# Patient Record
Sex: FEMALE | Race: OTHER | Employment: UNEMPLOYED | URBAN - METROPOLITAN AREA
[De-identification: names, ages, dates, MRNs, and addresses within clinical notes are randomized per-mention and may not be internally consistent; named-entity substitution may affect disease eponyms.]

---

## 2017-08-09 LAB
ANTIBODY SCREEN, EXTERNAL: NEGATIVE
CHLAMYDIA, EXTERNAL: NEGATIVE
HBSAG, EXTERNAL: NEGATIVE
HIV, EXTERNAL: NEGATIVE
RPR, EXTERNAL: NON REACTIVE
RUBELLA, EXTERNAL: NORMAL
TYPE, ABO & RH, EXTERNAL: NORMAL

## 2018-02-13 LAB
GRBS, EXTERNAL: NEGATIVE
N. GONORRHEA, EXTERNAL: NEGATIVE

## 2018-03-20 ENCOUNTER — HOSPITAL ENCOUNTER (INPATIENT)
Age: 28
LOS: 4 days | Discharge: HOME | End: 2018-03-24
Attending: OBSTETRICS & GYNECOLOGY | Admitting: OBSTETRICS & GYNECOLOGY
Payer: COMMERCIAL

## 2018-03-20 PROBLEM — Z33.1 IUP (INTRAUTERINE PREGNANCY), INCIDENTAL: Status: ACTIVE | Noted: 2018-03-20

## 2018-03-20 LAB
ABO + RH BLD: NORMAL
BASOPHILS # BLD: 0 K/UL (ref 0–0.06)
BASOPHILS NFR BLD: 0 % (ref 0–2)
BLOOD GROUP ANTIBODIES SERPL: NORMAL
DIFFERENTIAL METHOD BLD: ABNORMAL
EOSINOPHIL # BLD: 0.1 K/UL (ref 0–0.4)
EOSINOPHIL NFR BLD: 1 % (ref 0–5)
ERYTHROCYTE [DISTWIDTH] IN BLOOD BY AUTOMATED COUNT: 14.1 % (ref 11.6–14.5)
HCT VFR BLD AUTO: 36.3 % (ref 35–45)
HGB BLD-MCNC: 12 G/DL (ref 12–16)
LYMPHOCYTES # BLD: 2.3 K/UL (ref 0.9–3.6)
LYMPHOCYTES NFR BLD: 23 % (ref 21–52)
MCH RBC QN AUTO: 29.7 PG (ref 24–34)
MCHC RBC AUTO-ENTMCNC: 33.1 G/DL (ref 31–37)
MCV RBC AUTO: 89.9 FL (ref 74–97)
MONOCYTES # BLD: 0.7 K/UL (ref 0.05–1.2)
MONOCYTES NFR BLD: 7 % (ref 3–10)
NEUTS SEG # BLD: 6.6 K/UL (ref 1.8–8)
NEUTS SEG NFR BLD: 69 % (ref 40–73)
PLATELET # BLD AUTO: 215 K/UL (ref 135–420)
PMV BLD AUTO: 10.6 FL (ref 9.2–11.8)
RBC # BLD AUTO: 4.04 M/UL (ref 4.2–5.3)
SPECIMEN EXP DATE BLD: NORMAL
WBC # BLD AUTO: 9.7 K/UL (ref 4.6–13.2)

## 2018-03-20 PROCEDURE — 77030028565 HC CATH CERV RIPNG BLN COOK -B

## 2018-03-20 PROCEDURE — 74011250637 HC RX REV CODE- 250/637: Performed by: ADVANCED PRACTICE MIDWIFE

## 2018-03-20 PROCEDURE — 75410000002 HC LABOR FEE PER 1 HR

## 2018-03-20 PROCEDURE — 76815 OB US LIMITED FETUS(S): CPT

## 2018-03-20 PROCEDURE — 74011250636 HC RX REV CODE- 250/636

## 2018-03-20 PROCEDURE — 86900 BLOOD TYPING SEROLOGIC ABO: CPT | Performed by: ADVANCED PRACTICE MIDWIFE

## 2018-03-20 PROCEDURE — 36415 COLL VENOUS BLD VENIPUNCTURE: CPT | Performed by: ADVANCED PRACTICE MIDWIFE

## 2018-03-20 PROCEDURE — 74011250636 HC RX REV CODE- 250/636: Performed by: ADVANCED PRACTICE MIDWIFE

## 2018-03-20 PROCEDURE — 65270000029 HC RM PRIVATE

## 2018-03-20 PROCEDURE — 85025 COMPLETE CBC W/AUTO DIFF WBC: CPT | Performed by: ADVANCED PRACTICE MIDWIFE

## 2018-03-20 PROCEDURE — 3E0P7VZ INTRODUCTION OF HORMONE INTO FEMALE REPRODUCTIVE, VIA NATURAL OR ARTIFICIAL OPENING: ICD-10-PCS | Performed by: OBSTETRICS & GYNECOLOGY

## 2018-03-20 RX ORDER — BUTORPHANOL TARTRATE 2 MG/ML
2 INJECTION INTRAMUSCULAR; INTRAVENOUS
Status: DISCONTINUED | OUTPATIENT
Start: 2018-03-20 | End: 2018-03-21 | Stop reason: HOSPADM

## 2018-03-20 RX ORDER — ACETAMINOPHEN 325 MG/1
650 TABLET ORAL
Status: DISCONTINUED | OUTPATIENT
Start: 2018-03-20 | End: 2018-03-24 | Stop reason: HOSPADM

## 2018-03-20 RX ORDER — METHYLERGONOVINE MALEATE 0.2 MG/ML
0.2 INJECTION INTRAVENOUS AS NEEDED
Status: DISCONTINUED | OUTPATIENT
Start: 2018-03-20 | End: 2018-03-21 | Stop reason: HOSPADM

## 2018-03-20 RX ORDER — HYDROMORPHONE HYDROCHLORIDE 2 MG/ML
1 INJECTION, SOLUTION INTRAMUSCULAR; INTRAVENOUS; SUBCUTANEOUS
Status: DISCONTINUED | OUTPATIENT
Start: 2018-03-20 | End: 2018-03-21 | Stop reason: HOSPADM

## 2018-03-20 RX ORDER — HYDROMORPHONE HYDROCHLORIDE 2 MG/ML
1 INJECTION, SOLUTION INTRAMUSCULAR; INTRAVENOUS; SUBCUTANEOUS
Status: DISCONTINUED | OUTPATIENT
Start: 2018-03-20 | End: 2018-03-20 | Stop reason: RX

## 2018-03-20 RX ORDER — ACETAMINOPHEN 500 MG
500 TABLET ORAL
Status: DISCONTINUED | OUTPATIENT
Start: 2018-03-20 | End: 2018-03-20

## 2018-03-20 RX ORDER — MINERAL OIL
30 OIL (ML) ORAL AS NEEDED
Status: DISCONTINUED | OUTPATIENT
Start: 2018-03-20 | End: 2018-03-21 | Stop reason: HOSPADM

## 2018-03-20 RX ORDER — OXYTOCIN/RINGER'S LACTATE 20/1000 ML
125 PLASTIC BAG, INJECTION (ML) INTRAVENOUS CONTINUOUS
Status: DISCONTINUED | OUTPATIENT
Start: 2018-03-20 | End: 2018-03-21 | Stop reason: HOSPADM

## 2018-03-20 RX ORDER — OXYTOCIN IN 5 % DEXTROSE 30/500 ML
2 PLASTIC BAG, INJECTION (ML) INTRAVENOUS
Status: DISCONTINUED | OUTPATIENT
Start: 2018-03-21 | End: 2018-03-24 | Stop reason: HOSPADM

## 2018-03-20 RX ORDER — SODIUM CHLORIDE, SODIUM LACTATE, POTASSIUM CHLORIDE, CALCIUM CHLORIDE 600; 310; 30; 20 MG/100ML; MG/100ML; MG/100ML; MG/100ML
125 INJECTION, SOLUTION INTRAVENOUS CONTINUOUS
Status: DISCONTINUED | OUTPATIENT
Start: 2018-03-20 | End: 2018-03-24 | Stop reason: HOSPADM

## 2018-03-20 RX ORDER — TERBUTALINE SULFATE 1 MG/ML
0.25 INJECTION SUBCUTANEOUS
Status: DISCONTINUED | OUTPATIENT
Start: 2018-03-20 | End: 2018-03-21 | Stop reason: HOSPADM

## 2018-03-20 RX ORDER — LIDOCAINE HYDROCHLORIDE 10 MG/ML
20 INJECTION, SOLUTION EPIDURAL; INFILTRATION; INTRACAUDAL; PERINEURAL AS NEEDED
Status: DISCONTINUED | OUTPATIENT
Start: 2018-03-20 | End: 2018-03-21 | Stop reason: HOSPADM

## 2018-03-20 RX ORDER — NALBUPHINE HYDROCHLORIDE 10 MG/ML
10 INJECTION, SOLUTION INTRAMUSCULAR; INTRAVENOUS; SUBCUTANEOUS
Status: DISCONTINUED | OUTPATIENT
Start: 2018-03-20 | End: 2018-03-21 | Stop reason: HOSPADM

## 2018-03-20 RX ORDER — MISOPROSTOL 100 UG/1
TABLET ORAL
Status: DISPENSED
Start: 2018-03-20 | End: 2018-03-20

## 2018-03-20 RX ORDER — ONDANSETRON 2 MG/ML
INJECTION INTRAMUSCULAR; INTRAVENOUS
Status: COMPLETED
Start: 2018-03-20 | End: 2018-03-21

## 2018-03-20 RX ORDER — ONDANSETRON 2 MG/ML
4 INJECTION INTRAMUSCULAR; INTRAVENOUS
Status: DISCONTINUED | OUTPATIENT
Start: 2018-03-20 | End: 2018-03-24 | Stop reason: HOSPADM

## 2018-03-20 RX ORDER — OXYTOCIN IN 5 % DEXTROSE 30/500 ML
PLASTIC BAG, INJECTION (ML) INTRAVENOUS
Status: COMPLETED
Start: 2018-03-20 | End: 2018-03-20

## 2018-03-20 RX ORDER — OXYTOCIN/RINGER'S LACTATE 20/1000 ML
500 PLASTIC BAG, INJECTION (ML) INTRAVENOUS ONCE
Status: DISPENSED | OUTPATIENT
Start: 2018-03-20 | End: 2018-03-20

## 2018-03-20 RX ADMIN — SODIUM CHLORIDE, SODIUM LACTATE, POTASSIUM CHLORIDE, AND CALCIUM CHLORIDE 125 ML/HR: 600; 310; 30; 20 INJECTION, SOLUTION INTRAVENOUS at 08:53

## 2018-03-20 RX ADMIN — Medication 2 MILLI-UNITS/MIN: at 23:45

## 2018-03-20 RX ADMIN — BUTORPHANOL TARTRATE 2 MG: 2 INJECTION, SOLUTION INTRAMUSCULAR; INTRAVENOUS at 09:08

## 2018-03-20 RX ADMIN — ACETAMINOPHEN 650 MG: 325 TABLET, FILM COATED ORAL at 16:29

## 2018-03-20 NOTE — H&P
History & Physical    Name: Dionisio Melendrez MRN: [id number]  SSN: [id number]    YOB: 1990  Age: 28 y.o. Sex: female        Subjective:     Estimated Date of Delivery: None noted. OB History      Para Term  AB Living    1         SAB TAB Ectopic Molar Multiple Live Births                     Ms. Mary Orosco is admitted with pregnancy at 41.0 weeks gestation for Post-dates induction of labor . Prenatal course was normal. Please see prenatal records for details. Allergies   Allergen Reactions    Penicillins Anaphylaxis       Objective:     Vitals:  Vitals:    18 0831   Weight: 116.1 kg (256 lb)   Height: 4' 11\" (1.499 m)        Physical Exam:  Patient without distress. Heart: Regular rate and rhythm, S1S2 present or without murmur or extra heart sounds  Lung: clear to auscultation throughout lung fields, no wheezes, no rales, no rhonchi and normal respiratory effort  Back: costovertebral angle tenderness absent  Abdomen: soft, nontender  Fundus: soft and non tender  Perineum: blood absent, amniotic fluid absent  Cervical Exam: 0 cm dilated    0% effaced    -3 station    Presenting Part: cephalic  Cervical Position: posterior  Lower Extremities:  - Edema trace  Membranes:  Intact  Fetal Heart Rate & Contraction pattern: Reactive, Category I  Baseline: 150 per minute  Variability: moderate  Accelerations: yes  Decelerations: none  Uterine contractions: none    Prenatal Labs:   No results found for: RUBELLAEXT, GRBSEXT, HBSAGEXT, HIVEXT, RPREXT, GONNOEXT, CHLAMEXT      Assessment/Plan:   41.0 weeks gestation  IUP, IOL Post-dates    Plan: Admit for IOL. Reassuring fetal status, Will plan for mechanical dilatation with Cook balloon placement this morning to remain in place for 12 hours followed by Pitocin IV induction after removal of catheter. Continue plan for vaginal delivery, IV pain medication as needed and epidural per patient request. .  Group B Strep was negative.     Signed By: Maggie Moore CNM     March 20, 2018

## 2018-03-20 NOTE — PROCEDURES
The patient was placed in dorsal lithotomy position. Speculum was placed. Cervical os was closed. [employer] Cervical Ripening balloon was placed without difficulty through cervical os. 80 cc of fluid was placed in the uterine balloon, followed by 80 cc of fluid in the vaginal balloon. The patient tolerated the procedure well. FHTs were reactive.

## 2018-03-20 NOTE — PROGRESS NOTES
65 Pt arrived  at 41w0d for scheduled induction. Pt oriented to room with call bell in reach. Consents signed. 0851 Provider Leonardo Palomares CNM at bedside to do Ltd Ultrasound to confirm head is down    0908 Pt given stadol for anxiety before the procedure    949 Provider at bedside. Cook balloon placed. Pt tolerated well. Plan for Pit to start at 11pm    Bedside and Verbal shift change report given to ROSALBA Crow (oncoming nurse) by Alessandra Edouard RN (offgoing nurse). Report included the following information SBAR, Kardex, Procedure Summary, Intake/Output, MAR, Recent Results and Med Rec Status.

## 2018-03-20 NOTE — IP AVS SNAPSHOT
[address] [place] [address] 
[phone] Patient: Kasandra Stephenson MRN: [id number] About your hospitalization You were admitted on:  2018 You last received care in the:  [address] [place] [address] You were discharged on:  2018 Why you were hospitalized Your primary diagnosis was:  Not on File Your diagnoses also included:  Iup (Intrauterine Pregnancy), Incidental, Arrested Active Phase Of Labor, Postpartum Care Following  Delivery Follow-up Information Follow up With Details Comments Contact Info None   None ([phone]) Patient stated that they have no PCP Jyothi Haji MD   [facility] [address] [facility] 150 
[phone] Jyothi Haji MD In 6 weeks Schedule follow-up postpartum appointment in 6 weeks with OB/GYN office. [facility] [address] [facility] 150 
[phone] Discharge Orders None A check zay indicates which time of day the medication should be taken. My Medications START taking these medications Instructions Each Dose to Equal  
 Morning Noon Evening Bedtime  
 docusate sodium 100 mg capsule Commonly known as:  Manjula Handley Your last dose was: Your next dose is: Take 1 Cap by mouth two (2) times daily as needed for Constipation. 100 mg  
    
   
   
   
  
 ferrous sulfate 325 mg (65 mg iron) tablet Your last dose was: Your next dose is: Take 1 Tab by mouth two (2) times daily (with meals). Indications: Iron Deficiency Anemia 325 mg  
    
   
   
   
  
 ibuprofen 800 mg tablet Commonly known as:  MOTRIN Start taking on:  3/25/2018 Your last dose was: Your next dose is: Take 1 Tab by mouth every eight (8) hours as needed. Indications: Pain  800 mg  
    
   
   
   
  
 oxyCODONE-acetaminophen 5-325 mg per tablet Commonly known as:  PERCOCET Your last dose was: Your next dose is: Take 1-2 Tabs by mouth every six (6) hours as needed. Max Daily Amount: 8 Tabs. 1-2 Tab CONTINUE taking these medications Instructions Each Dose to Equal  
 Morning Noon Evening Bedtime PNV#16-Iron Fum & PS-FA-OM-3 35-1-200 mg Cap Your last dose was: Your next dose is: Take  by mouth. Where to Get Your Medications These medications were sent to [facility], [address]  [address] [place] [address], [facility] [place] [address] [address] Phone:  [phone]  
  docusate sodium 100 mg capsule  
 ferrous sulfate 325 mg (65 mg iron) tablet  
 ibuprofen 800 mg tablet Information on where to get these meds will be given to you by the nurse or doctor. ! Ask your nurse or doctor about these medications  
  oxyCODONE-acetaminophen 5-325 mg per tablet Discharge Instructions Patient given copies of Post Birth Warning signs and Post Birth discharge instructions. Help after discharge pamphlet given as well. [employer] Activation Thank you for requesting access to [employer]. Please follow the instructions below to securely access and download your online medical record. [employer] allows you to send messages to your doctor, view your test results, renew your prescriptions, schedule appointments, and more. How Do I Sign Up? 1. In your internet browser, go to [email or web address]. 2. Click on the First Time User? Click Here link in the Sign In box. You will see the New Member Sign Up page. 3. Enter your [employer] Access Code exactly as it appears below. You will not need to use this code after youve completed the sign-up process. If you do not sign up before the expiration date, you must request a new code. [employer] Access Code: [email or web address] Expires: 2018 10:31 AM (This is the date your [employer] access code will ) 4. Enter the last four digits of your Social Security Number (xxxx) and Date of Birth (mm/dd/yyyy) as indicated and click Submit. You will be taken to the next sign-up page. 5. Create a [employer] ID. This will be your [employer] login ID and cannot be changed, so think of one that is secure and easy to remember. 6. Create a Plex System[employer] password. You can change your password at any time. 7. Enter your Password Reset Question and Answer. This can be used at a later time if you forget your password. 8. Enter your e-mail address. You will receive e-mail notification when new information is available in [address] 9. Click Sign Up. You can now view and download portions of your medical record. 10. Click the Download Summary menu link to download a portable copy of your medical information. Additional Information If you have questions, please visit the Frequently Asked Questions section of the [employer] website at [email or web address] Remember, [employer] is NOT to be used for urgent needs. For medical emergencies, dial 911. Patient armband removed and given to patient to take home. Patient was informed of the privacy risks if armband lost or stolen Introducing [facility]! [facility] introduces [employer] patient portal. Now you can access parts of your medical record, email your doctor's office, and request medication refills online. 1. In your internet browser, go to [email or web address] 2. Click on the First Time User? Click Here link in the Sign In box. You will see the New Member Sign Up page. 3. Enter your [employer] Access Code exactly as it appears below. You will not need to use this code after youve completed the sign-up process. If you do not sign up before the expiration date, you must request a new code. · [employer] Access Code: [email or web address] Expires: 6/22/2018 10:31 AM 
 
4. Enter the last four digits of your Social Security Number (xxxx) and Date of Birth (mm/dd/yyyy) as indicated and click Submit. You will be taken to the next sign-up page. 5. Create a [employer] ID. This will be your [employer] login ID and cannot be changed, so think of one that is secure and easy to remember. 6. Create a [employer] password. You can change your password at any time. 7. Enter your Password Reset Question and Answer. This can be used at a later time if you forget your password. 8. Enter your e-mail address. You will receive e-mail notification when new information is available in [address] 9. Click Sign Up. You can now view and download portions of your medical record. 10. Click the Download Summary menu link to download a portable copy of your medical information. If you have questions, please visit the Frequently Asked Questions section of the [employer] website. Remember, [employer] is NOT to be used for urgent needs. For medical emergencies, dial 911. Now available from your iPhone and Android! Providers Seen During Your Hospitalization Provider Specialty Primary office phone Kanwal Cottrell MD Obstetrics & Gynecology [phone] Your Primary Care Physician (PCP) Primary Care Physician Office Phone Office Fax NONE ** None ** ** None ** You are allergic to the following Allergen Reactions Penicillins Anaphylaxis Recent Documentation Height Weight Breastfeeding? BMI OB Status Smoking Status 1.499 m 116.1 kg Yes 51.71 kg/m2 Recent pregnancy Never Smoker Emergency Contacts Name Discharge Info Relation Home Work Mobile Shahab Devi DISCHARGE CAREGIVER [3] Spouse [3] [phone] Patient Belongings The following personal items are in your possession at time of discharge: Dental Appliances: None  Visual Aid: None      Home Medications: None   Jewelry: None  Clothing: At bedside Please provide this summary of care documentation to your next provider. Signatures-by signing, you are acknowledging that this After Visit Summary has been reviewed with you and you have received a copy. Patient Signature:  ____________________________________________________________ Date:  ____________________________________________________________  
  
[facility] Provider Signature:  ____________________________________________________________ Date:  ____________________________________________________________

## 2018-03-20 NOTE — IP AVS SNAPSHOT
[place] 
 
 
 [address] [facility] [address] 
[phone] Patient: Tish Zayas MRN: [id number] GHU:[birth year] A check zay indicates which time of day the medication should be taken. My Medications START taking these medications Instructions Each Dose to Equal  
 Morning Noon Evening Bedtime  
 docusate sodium 100 mg capsule Commonly known as:  Livan Erm Your last dose was: Your next dose is: Take 1 Cap by mouth two (2) times daily as needed for Constipation. 100 mg  
    
   
   
   
  
 ferrous sulfate 325 mg (65 mg iron) tablet Your last dose was: Your next dose is: Take 1 Tab by mouth two (2) times daily (with meals). Indications: Iron Deficiency Anemia 325 mg  
    
   
   
   
  
 ibuprofen 800 mg tablet Commonly known as:  MOTRIN Start taking on:  3/25/2018 Your last dose was: Your next dose is: Take 1 Tab by mouth every eight (8) hours as needed. Indications: Pain 800 mg  
    
   
   
   
  
 oxyCODONE-acetaminophen 5-325 mg per tablet Commonly known as:  PERCOCET Your last dose was: Your next dose is: Take 1-2 Tabs by mouth every six (6) hours as needed. Max Daily Amount: 8 Tabs. 1-2 Tab CONTINUE taking these medications Instructions Each Dose to Equal  
 Morning Noon Evening Bedtime PNV#16-Iron Fum & PS-FA-OM-3 35-1-200 mg Cap Your last dose was: Your next dose is: Take  by mouth. Where to Get Your Medications These medications were sent to [address] [facility], [address], [place] [address] Phone:  [phone]  
  docusate sodium 100 mg capsule  
 ferrous sulfate 325 mg (65 mg iron) tablet  
 ibuprofen 800 mg tablet Information on where to get these meds will be given to you by the nurse or doctor. ! Ask your nurse or doctor about these medications  
  oxyCODONE-acetaminophen 5-325 mg per tablet

## 2018-03-20 NOTE — PROGRESS NOTES
1908 Bedside and Verbal shift change report given to olinda rn (oncoming nurse) by jazmine rn (offgoing nurse). Report included the following information SBAR, Procedure Summary, Intake/Output, MAR and Recent Results. 1910 head to toe assessment complete. 1958 patient ambulates to restroom with minimal assistance needed, toco and efm disconnected    2003 patient ambulates back to bed, toco and emf reconnected     2007 patient laying on right side     2150 cervical ripening balloon (XtremIO) removed, 80/80cc removed from each balloon     2218 patient up to restroom toco and efm disconnected     2230 SVE 3/60/-3    2238 CNM at bedside confirming vertex position     2245 patient ambulates to restroom to shower, iv saline locked and covered; toco and efm disconnected    2345 pitocin started at [facility] patient on pitocin titation, no new questions at this time    0230 called into patients room patient stated she felt she was \"peeing on herself\", patients srom'ed confirmed with positive Nitrazine test, clear and small in amount     0235 patient up to restroom pad changed    0314 patient turned to left side     0358 patient request for sve, sve unchanged    0408 patient up to restroom     [id number] at bedside discussing [facility] 52 patient request epidural, bolus infusing     5750 paged anesthesiologist on call for epidural     0711 Bedside and Verbal shift change report given to gregory rn (oncoming nurse) by olinda rn  (offgoing nurse). Report included the following information SBAR, Intake/Output, MAR and Recent Results.

## 2018-03-21 ENCOUNTER — ANESTHESIA EVENT (OUTPATIENT)
Dept: LABOR AND DELIVERY | Age: 28
End: 2018-03-21
Payer: COMMERCIAL

## 2018-03-21 ENCOUNTER — ANESTHESIA (OUTPATIENT)
Dept: LABOR AND DELIVERY | Age: 28
End: 2018-03-21
Payer: COMMERCIAL

## 2018-03-21 PROBLEM — Z33.1 IUP (INTRAUTERINE PREGNANCY), INCIDENTAL: Status: RESOLVED | Noted: 2018-03-20 | Resolved: 2018-03-21

## 2018-03-21 PROCEDURE — 77010026065 HC OXYGEN MINIMUM MEDICAL AIR

## 2018-03-21 PROCEDURE — 77030031139 HC SUT VCRL2 J&J -A: Performed by: OBSTETRICS & GYNECOLOGY

## 2018-03-21 PROCEDURE — 74011250636 HC RX REV CODE- 250/636

## 2018-03-21 PROCEDURE — 76010000391 HC C SECN FIRST 1 HR: Performed by: OBSTETRICS & GYNECOLOGY

## 2018-03-21 PROCEDURE — 75410000002 HC LABOR FEE PER 1 HR

## 2018-03-21 PROCEDURE — 74011000250 HC RX REV CODE- 250

## 2018-03-21 PROCEDURE — 77030007879 HC KT SPN EPDRL TELE -B: Performed by: ANESTHESIOLOGY

## 2018-03-21 PROCEDURE — 77030009413 HC ELECTRD SCALP COVD -A

## 2018-03-21 PROCEDURE — 74011250637 HC RX REV CODE- 250/637

## 2018-03-21 PROCEDURE — 77030032490 HC SLV COMPR SCD KNE COVD -B: Performed by: OBSTETRICS & GYNECOLOGY

## 2018-03-21 PROCEDURE — 76060000034 HC ANESTHESIA 1.5 TO 2 HR: Performed by: OBSTETRICS & GYNECOLOGY

## 2018-03-21 PROCEDURE — 76010000392 HC C SECN EA ADDL 0.5 HR: Performed by: OBSTETRICS & GYNECOLOGY

## 2018-03-21 PROCEDURE — 76060000078 HC EPIDURAL ANESTHESIA

## 2018-03-21 PROCEDURE — 77030034849

## 2018-03-21 PROCEDURE — 65270000029 HC RM PRIVATE

## 2018-03-21 PROCEDURE — 77030011943

## 2018-03-21 PROCEDURE — 75410000003 HC RECOV DEL/VAG/CSECN EA 0.5 HR: Performed by: OBSTETRICS & GYNECOLOGY

## 2018-03-21 PROCEDURE — 77030011640 HC PAD GRND REM COVD -A: Performed by: OBSTETRICS & GYNECOLOGY

## 2018-03-21 PROCEDURE — 88307 TISSUE EXAM BY PATHOLOGIST: CPT | Performed by: OBSTETRICS & GYNECOLOGY

## 2018-03-21 PROCEDURE — 00HU33Z INSERTION OF INFUSION DEVICE INTO SPINAL CANAL, PERCUTANEOUS APPROACH: ICD-10-PCS | Performed by: ANESTHESIOLOGY

## 2018-03-21 PROCEDURE — 77030002933 HC SUT MCRYL J&J -A: Performed by: OBSTETRICS & GYNECOLOGY

## 2018-03-21 PROCEDURE — 74011250636 HC RX REV CODE- 250/636: Performed by: ADVANCED PRACTICE MIDWIFE

## 2018-03-21 PROCEDURE — 77030010848 HC CATH INTUTR PRSS KOLB -B

## 2018-03-21 PROCEDURE — 77030002888 HC SUT CHRMC J&J -A: Performed by: OBSTETRICS & GYNECOLOGY

## 2018-03-21 PROCEDURE — 75410000003 HC RECOV DEL/VAG/CSECN EA 0.5 HR

## 2018-03-21 PROCEDURE — 74011250636 HC RX REV CODE- 250/636: Performed by: ANESTHESIOLOGY

## 2018-03-21 PROCEDURE — 77030033269 HC SLV COMPR SCD KNE2 CARD -B

## 2018-03-21 RX ORDER — SODIUM CHLORIDE 0.9 % (FLUSH) 0.9 %
5-10 SYRINGE (ML) INJECTION AS NEEDED
Status: DISCONTINUED | OUTPATIENT
Start: 2018-03-21 | End: 2018-03-24 | Stop reason: HOSPADM

## 2018-03-21 RX ORDER — FENTANYL/ROPIVACAINE/NS/PF 2MCG/ML-.1
PLASTIC BAG, INJECTION (ML) EPIDURAL
Status: COMPLETED
Start: 2018-03-21 | End: 2018-03-21

## 2018-03-21 RX ORDER — OXYCODONE AND ACETAMINOPHEN 5; 325 MG/1; MG/1
2 TABLET ORAL
Status: CANCELLED | OUTPATIENT
Start: 2018-03-21

## 2018-03-21 RX ORDER — ONDANSETRON 2 MG/ML
4 INJECTION INTRAMUSCULAR; INTRAVENOUS
Status: DISCONTINUED | OUTPATIENT
Start: 2018-03-21 | End: 2018-03-24 | Stop reason: HOSPADM

## 2018-03-21 RX ORDER — MIDAZOLAM HYDROCHLORIDE 1 MG/ML
INJECTION, SOLUTION INTRAMUSCULAR; INTRAVENOUS AS NEEDED
Status: DISCONTINUED | OUTPATIENT
Start: 2018-03-21 | End: 2018-03-21 | Stop reason: HOSPADM

## 2018-03-21 RX ORDER — MISOPROSTOL 100 UG/1
TABLET ORAL
Status: COMPLETED
Start: 2018-03-21 | End: 2018-03-21

## 2018-03-21 RX ORDER — FENTANYL CITRATE 50 UG/ML
INJECTION, SOLUTION INTRAMUSCULAR; INTRAVENOUS AS NEEDED
Status: DISCONTINUED | OUTPATIENT
Start: 2018-03-21 | End: 2018-03-21 | Stop reason: HOSPADM

## 2018-03-21 RX ORDER — METOCLOPRAMIDE HYDROCHLORIDE 5 MG/ML
INJECTION INTRAMUSCULAR; INTRAVENOUS AS NEEDED
Status: DISCONTINUED | OUTPATIENT
Start: 2018-03-21 | End: 2018-03-21 | Stop reason: HOSPADM

## 2018-03-21 RX ORDER — METHYLERGONOVINE MALEATE 0.2 MG/ML
INJECTION INTRAVENOUS AS NEEDED
Status: DISCONTINUED | OUTPATIENT
Start: 2018-03-21 | End: 2018-03-21 | Stop reason: HOSPADM

## 2018-03-21 RX ORDER — ACETAMINOPHEN 325 MG/1
650 TABLET ORAL
Status: DISCONTINUED | OUTPATIENT
Start: 2018-03-21 | End: 2018-03-23

## 2018-03-21 RX ORDER — ZOLPIDEM TARTRATE 5 MG/1
5 TABLET ORAL
Status: CANCELLED | OUTPATIENT
Start: 2018-03-21

## 2018-03-21 RX ORDER — FENTANYL/ROPIVACAINE/NS/PF 2MCG/ML-.1
1-15 PLASTIC BAG, INJECTION (ML) EPIDURAL
Status: DISCONTINUED | OUTPATIENT
Start: 2018-03-21 | End: 2018-03-21 | Stop reason: HOSPADM

## 2018-03-21 RX ORDER — CEFAZOLIN SODIUM IN 0.9 % NACL 2 G/100 ML
PLASTIC BAG, INJECTION (ML) INTRAVENOUS AS NEEDED
Status: DISCONTINUED | OUTPATIENT
Start: 2018-03-21 | End: 2018-03-21 | Stop reason: HOSPADM

## 2018-03-21 RX ORDER — SODIUM CHLORIDE 0.9 % (FLUSH) 0.9 %
5-10 SYRINGE (ML) INJECTION EVERY 8 HOURS
Status: DISCONTINUED | OUTPATIENT
Start: 2018-03-21 | End: 2018-03-21 | Stop reason: HOSPADM

## 2018-03-21 RX ORDER — NALOXONE HYDROCHLORIDE 0.4 MG/ML
0.2 INJECTION, SOLUTION INTRAMUSCULAR; INTRAVENOUS; SUBCUTANEOUS
Status: DISCONTINUED | OUTPATIENT
Start: 2018-03-21 | End: 2018-03-24 | Stop reason: HOSPADM

## 2018-03-21 RX ORDER — SODIUM CHLORIDE 0.9 % (FLUSH) 0.9 %
5-10 SYRINGE (ML) INJECTION EVERY 8 HOURS
Status: DISCONTINUED | OUTPATIENT
Start: 2018-03-21 | End: 2018-03-22

## 2018-03-21 RX ORDER — NALBUPHINE HYDROCHLORIDE 10 MG/ML
5 INJECTION, SOLUTION INTRAMUSCULAR; INTRAVENOUS; SUBCUTANEOUS
Status: ACTIVE | OUTPATIENT
Start: 2018-03-21 | End: 2018-03-22

## 2018-03-21 RX ORDER — AMOXICILLIN 250 MG
1 CAPSULE ORAL
Status: CANCELLED | OUTPATIENT
Start: 2018-03-21

## 2018-03-21 RX ORDER — FENTANYL CITRATE 50 UG/ML
100 INJECTION, SOLUTION INTRAMUSCULAR; INTRAVENOUS ONCE
Status: ACTIVE | OUTPATIENT
Start: 2018-03-21 | End: 2018-03-21

## 2018-03-21 RX ORDER — ACETAMINOPHEN 325 MG/1
650 TABLET ORAL
Status: CANCELLED | OUTPATIENT
Start: 2018-03-21

## 2018-03-21 RX ORDER — LIDOCAINE HYDROCHLORIDE AND EPINEPHRINE 15; 5 MG/ML; UG/ML
INJECTION, SOLUTION EPIDURAL AS NEEDED
Status: DISCONTINUED | OUTPATIENT
Start: 2018-03-21 | End: 2018-03-21 | Stop reason: HOSPADM

## 2018-03-21 RX ORDER — NALOXONE HYDROCHLORIDE 0.4 MG/ML
0.2 INJECTION, SOLUTION INTRAMUSCULAR; INTRAVENOUS; SUBCUTANEOUS AS NEEDED
Status: DISCONTINUED | OUTPATIENT
Start: 2018-03-21 | End: 2018-03-21 | Stop reason: HOSPADM

## 2018-03-21 RX ORDER — PROMETHAZINE HYDROCHLORIDE 25 MG/ML
25 INJECTION, SOLUTION INTRAMUSCULAR; INTRAVENOUS
Status: CANCELLED | OUTPATIENT
Start: 2018-03-21

## 2018-03-21 RX ORDER — ROPIVACAINE HYDROCHLORIDE 2 MG/ML
INJECTION, SOLUTION EPIDURAL; INFILTRATION; PERINEURAL AS NEEDED
Status: DISCONTINUED | OUTPATIENT
Start: 2018-03-21 | End: 2018-03-21 | Stop reason: HOSPADM

## 2018-03-21 RX ORDER — CEFAZOLIN SODIUM 2 G/50ML
SOLUTION INTRAVENOUS
Status: DISPENSED
Start: 2018-03-21 | End: 2018-03-22

## 2018-03-21 RX ORDER — SODIUM CHLORIDE 0.9 % (FLUSH) 0.9 %
5-10 SYRINGE (ML) INJECTION AS NEEDED
Status: DISCONTINUED | OUTPATIENT
Start: 2018-03-21 | End: 2018-03-21 | Stop reason: HOSPADM

## 2018-03-21 RX ORDER — IBUPROFEN 400 MG/1
800 TABLET ORAL
Status: CANCELLED | OUTPATIENT
Start: 2018-03-21

## 2018-03-21 RX ORDER — CEFAZOLIN SODIUM 2 G/50ML
2 SOLUTION INTRAVENOUS EVERY 6 HOURS
Status: COMPLETED | OUTPATIENT
Start: 2018-03-22 | End: 2018-03-22

## 2018-03-21 RX ORDER — TRISODIUM CITRATE DIHYDRATE AND CITRIC ACID MONOHYDRATE 500; 334 MG/5ML; MG/5ML
SOLUTION ORAL
Status: DISPENSED
Start: 2018-03-21 | End: 2018-03-22

## 2018-03-21 RX ORDER — ONDANSETRON 2 MG/ML
INJECTION INTRAMUSCULAR; INTRAVENOUS AS NEEDED
Status: DISCONTINUED | OUTPATIENT
Start: 2018-03-21 | End: 2018-03-21 | Stop reason: HOSPADM

## 2018-03-21 RX ORDER — LIDOCAINE HYDROCHLORIDE AND EPINEPHRINE 20; 5 MG/ML; UG/ML
INJECTION, SOLUTION EPIDURAL; INFILTRATION; INTRACAUDAL; PERINEURAL AS NEEDED
Status: DISCONTINUED | OUTPATIENT
Start: 2018-03-21 | End: 2018-03-21 | Stop reason: HOSPADM

## 2018-03-21 RX ORDER — DIPHENHYDRAMINE HYDROCHLORIDE 50 MG/ML
25 INJECTION, SOLUTION INTRAMUSCULAR; INTRAVENOUS
Status: DISCONTINUED | OUTPATIENT
Start: 2018-03-21 | End: 2018-03-21 | Stop reason: HOSPADM

## 2018-03-21 RX ORDER — CARBOPROST TROMETHAMINE 250 UG/ML
INJECTION, SOLUTION INTRAMUSCULAR
Status: COMPLETED
Start: 2018-03-21 | End: 2018-03-21

## 2018-03-21 RX ORDER — LORATADINE 10 MG/1
10 TABLET ORAL DAILY PRN
Status: DISCONTINUED | OUTPATIENT
Start: 2018-03-21 | End: 2018-03-24 | Stop reason: HOSPADM

## 2018-03-21 RX ORDER — DOCUSATE SODIUM 100 MG/1
100 CAPSULE, LIQUID FILLED ORAL 2 TIMES DAILY
Status: DISCONTINUED | OUTPATIENT
Start: 2018-03-21 | End: 2018-03-24 | Stop reason: HOSPADM

## 2018-03-21 RX ORDER — OXYTOCIN IN 5 % DEXTROSE 30/500 ML
.5-2 PLASTIC BAG, INJECTION (ML) INTRAVENOUS
Status: DISCONTINUED | OUTPATIENT
Start: 2018-03-21 | End: 2018-03-24 | Stop reason: HOSPADM

## 2018-03-21 RX ORDER — KETOROLAC TROMETHAMINE 30 MG/ML
30 INJECTION, SOLUTION INTRAMUSCULAR; INTRAVENOUS
Status: DISCONTINUED | OUTPATIENT
Start: 2018-03-21 | End: 2018-03-23

## 2018-03-21 RX ORDER — MORPHINE SULFATE 1 MG/ML
INJECTION, SOLUTION EPIDURAL; INTRATHECAL; INTRAVENOUS AS NEEDED
Status: DISCONTINUED | OUTPATIENT
Start: 2018-03-21 | End: 2018-03-21 | Stop reason: HOSPADM

## 2018-03-21 RX ORDER — OXYTOCIN/RINGER'S LACTATE 20/1000 ML
PLASTIC BAG, INJECTION (ML) INTRAVENOUS
Status: DISCONTINUED | OUTPATIENT
Start: 2018-03-21 | End: 2018-03-21 | Stop reason: HOSPADM

## 2018-03-21 RX ORDER — CARBOPROST TROMETHAMINE 250 UG/ML
INJECTION, SOLUTION INTRAMUSCULAR AS NEEDED
Status: DISCONTINUED | OUTPATIENT
Start: 2018-03-21 | End: 2018-03-21 | Stop reason: HOSPADM

## 2018-03-21 RX ORDER — FENTANYL CITRATE 50 UG/ML
INJECTION, SOLUTION INTRAMUSCULAR; INTRAVENOUS
Status: COMPLETED
Start: 2018-03-21 | End: 2018-03-21

## 2018-03-21 RX ADMIN — MORPHINE SULFATE 2 MG: 1 INJECTION, SOLUTION EPIDURAL; INTRATHECAL; INTRAVENOUS at 21:08

## 2018-03-21 RX ADMIN — ROPIVACAINE HYDROCHLORIDE 2 ML: 2 INJECTION, SOLUTION EPIDURAL; INFILTRATION; PERINEURAL at 07:40

## 2018-03-21 RX ADMIN — LIDOCAINE HYDROCHLORIDE AND EPINEPHRINE 5 ML: 20; 5 INJECTION, SOLUTION EPIDURAL; INFILTRATION; INTRACAUDAL; PERINEURAL at 20:37

## 2018-03-21 RX ADMIN — ONDANSETRON 4 MG: 2 INJECTION INTRAMUSCULAR; INTRAVENOUS at 07:19

## 2018-03-21 RX ADMIN — CARBOPROST TROMETHAMINE 250 MCG: 250 INJECTION, SOLUTION INTRAMUSCULAR at 21:14

## 2018-03-21 RX ADMIN — METHYLERGONOVINE MALEATE 0.2 MG: 0.2 INJECTION INTRAVENOUS at 21:08

## 2018-03-21 RX ADMIN — SODIUM CHLORIDE, SODIUM LACTATE, POTASSIUM CHLORIDE, AND CALCIUM CHLORIDE: 600; 310; 30; 20 INJECTION, SOLUTION INTRAVENOUS at 20:57

## 2018-03-21 RX ADMIN — FENTANYL CITRATE 50 MCG: 50 INJECTION, SOLUTION INTRAMUSCULAR; INTRAVENOUS at 21:19

## 2018-03-21 RX ADMIN — ROPIVACAINE HYDROCHLORIDE 3 ML: 2 INJECTION, SOLUTION EPIDURAL; INFILTRATION; PERINEURAL at 07:41

## 2018-03-21 RX ADMIN — Medication 8 ML/HR: at 07:48

## 2018-03-21 RX ADMIN — ONDANSETRON 4 MG: 2 INJECTION INTRAMUSCULAR; INTRAVENOUS at 21:08

## 2018-03-21 RX ADMIN — LIDOCAINE HYDROCHLORIDE AND EPINEPHRINE 5 ML: 20; 5 INJECTION, SOLUTION EPIDURAL; INFILTRATION; INTRACAUDAL; PERINEURAL at 20:27

## 2018-03-21 RX ADMIN — ROPIVACAINE HYDROCHLORIDE 3 ML: 2 INJECTION, SOLUTION EPIDURAL; INFILTRATION; PERINEURAL at 07:42

## 2018-03-21 RX ADMIN — ROPIVACAINE HYDROCHLORIDE 5 ML: 2 INJECTION, SOLUTION EPIDURAL; INFILTRATION; PERINEURAL at 13:53

## 2018-03-21 RX ADMIN — MIDAZOLAM HYDROCHLORIDE 2 MG: 1 INJECTION, SOLUTION INTRAMUSCULAR; INTRAVENOUS at 21:16

## 2018-03-21 RX ADMIN — LIDOCAINE HYDROCHLORIDE AND EPINEPHRINE 5 ML: 20; 5 INJECTION, SOLUTION EPIDURAL; INFILTRATION; INTRACAUDAL; PERINEURAL at 20:32

## 2018-03-21 RX ADMIN — LIDOCAINE HYDROCHLORIDE AND EPINEPHRINE 5 ML: 20; 5 INJECTION, SOLUTION EPIDURAL; INFILTRATION; INTRACAUDAL; PERINEURAL at 20:42

## 2018-03-21 RX ADMIN — MISOPROSTOL 1000 MCG: 100 TABLET ORAL at 21:55

## 2018-03-21 RX ADMIN — Medication 2 G: at 20:48

## 2018-03-21 RX ADMIN — Medication: at 21:04

## 2018-03-21 RX ADMIN — KETOROLAC TROMETHAMINE 30 MG: 30 INJECTION, SOLUTION INTRAMUSCULAR at 22:56

## 2018-03-21 RX ADMIN — FENTANYL CITRATE 50 MCG: 50 INJECTION, SOLUTION INTRAMUSCULAR; INTRAVENOUS at 21:17

## 2018-03-21 RX ADMIN — BUTORPHANOL TARTRATE 2 MG: 2 INJECTION, SOLUTION INTRAMUSCULAR; INTRAVENOUS at 04:11

## 2018-03-21 RX ADMIN — FENTANYL CITRATE 100 MCG: 50 INJECTION, SOLUTION INTRAMUSCULAR; INTRAVENOUS at 07:43

## 2018-03-21 RX ADMIN — ROPIVACAINE HYDROCHLORIDE 10 ML/HR: 10 INJECTION, SOLUTION EPIDURAL at 14:25

## 2018-03-21 RX ADMIN — LIDOCAINE HYDROCHLORIDE AND EPINEPHRINE 3 ML: 15; 5 INJECTION, SOLUTION EPIDURAL at 07:38

## 2018-03-21 RX ADMIN — METOCLOPRAMIDE HYDROCHLORIDE 10 MG: 5 INJECTION INTRAMUSCULAR; INTRAVENOUS at 21:08

## 2018-03-21 RX ADMIN — Medication: at 21:46

## 2018-03-21 RX ADMIN — Medication 10 ML/HR: at 14:25

## 2018-03-21 RX ADMIN — ROPIVACAINE HYDROCHLORIDE 8 ML/HR: 10 INJECTION, SOLUTION EPIDURAL at 07:48

## 2018-03-21 NOTE — ANESTHESIA PROCEDURE NOTES
Epidural Block    Start time: 3/21/2018 7:28 AM  Performed by: Marcelino Riley by: Codi Aguirre     Pre-Procedure  Indication: labor epidural    Preanesthetic Checklist: patient identified, risks and benefits discussed, anesthesia consent, site marked, patient being monitored, timeout performed and anesthesia consent    Timeout Time: 07:34        Epidural:   Patient position:  Seated  Prep region:  Lumbar  Prep: Chlorhexidine    Location:  L4-5    Needle and Epidural Catheter:   Needle Type:  Tuohy  Needle Gauge:  17 G  Injection Technique:  Loss of resistance using saline  Attempts:  1  Catheter Size:  20 G  Catheter at Skin Depth (cm):  14  Depth in Epidural Space (cm):  6  Events: no blood with aspiration, no cerebrospinal fluid with aspiration, no paresthesia and negative aspiration test    Test Dose:  Negative and lidocaine 1.5% w/ epi    Assessment:   Catheter Secured:  Tegaderm and tape  Insertion:  Uncomplicated  Patient tolerance:  Patient tolerated the procedure well with no immediate complications  Ultrasound pre-scan Accuro

## 2018-03-21 NOTE — PROGRESS NOTES
4285 Bedside/verbal report rec'd by Leo Pimentel RN.  4653 Pt up to BR to void  0728 Dr. Laney Ortega at bedside explaining epidural procedure. Pt verbalizes understanding and signed consent. 6828 Timeout performed. 8765 Needle placed   0737 Epidural catheter placed and needle removed. 9621 Test dose administered  0740 Fentanyl handed to Dr. Laney Ortega. Procedure complete. 0841 Pt placed on right side with peanut ball.   0921 Pt turned to left side with peanut ball. 1009 Pt placed on bedpan  1030 pt unable to urine, bladder scanned for 430cc, straight cathed for 250cc urine. 1036 SVE 3-4/90/-3  1143 SVE 5/100/-3 by Nghia Solitario CNM, IUPC placed. 1525 SVE 6/100/-3 By Nghia Solitario CNM. Pitocin stopped will resume in 1 hour. 1643 Pitocin restarted at 2/brandee-units/min, to be increased by 2 every 20 mins per Jacquelin Harder. 1720 Pt placed on hands and knees. Pt was able to turn with minimal assist.   1938 Bedside/verbal report given to Fernanda Zhang RN.

## 2018-03-21 NOTE — PROGRESS NOTES
Labor Progress Note  Patient seen, fetal heart rate and contraction pattern evaluated, patient examined. Patient laboring on birthing ball and breathing through contractions. No data found. Physical Exam:  Cervical Exam:  3 cm dilated    60% effaced    -3 station    Presenting Part: cephalic  Cervical Position: mid position  Membranes:  Spontaneous Rupture of Membranes; Amniotic Fluid: clear fluid  Uterine Activity: Frequency: Every 2-3 minutes and Intensity: mild to moderate  Fetal Heart Rate: Reactive, Category I  Baseline: 130 per minute  Variability: moderate  Accelerations: yes  Decelerations: none    Assessment/Plan:  41.1 weeks IUP,,  IOL Post-dates   Reassuring fetal status, Labor progressing slowly. Pitocin at 10 mu/min. Reviewed with patient plan for IUPC placement,she desires Epidural prior to exam. Anesthesia paged for epidural pain mangaement. Continue expectant management, Continue plan for vaginal delivery.

## 2018-03-21 NOTE — ROUTINE PROCESS
Bedside and Verbal shift change report given to Savannah Mobley RN   (oncoming nurse) by Velma Quintana RN (offgoing nurse). Report included the following information SBAR, Kardex, Intake/Output, MAR and Med Rec Status.

## 2018-03-21 NOTE — PROGRESS NOTES
Labor Progress Note  Patient seen, fetal heart rate and contraction pattern evaluated, patient examined. Patient Vitals for the past 1 hrs:   Temp   18 1931 98.1 °F (36.7 °C)       Physical Exam:  Cervical Exam:  6/90 %/-1/   Membranes:  leaking clear fluid, afebrile  Uterine Activity: Frequency: Every 3 minutes  Fetal Heart Rate: Reactive    Assessment/Plan:  Reassuring fetal status, Labor  Not progressing normall, Proceed with  Section for labor not progressing normally, findings consistent with failure of descent and failure of dilatation. Recommended proceeding with  delivery. Risks of bleeding, infection, bladder and bowel damage explained to patient. They understand the situation and consent to the  delivery. Consulted with Dr. Evonne Shepherd, on his way to perform primary  section.

## 2018-03-21 NOTE — PROGRESS NOTES
Labor Progress Note  Patient seen, fetal heart rate and contraction pattern evaluated, patient examined. No data found. Physical Exam:  Cervical Exam:  3 (3-4)/90 %/-2/   Membranes:  leaking  Uterine Activity: Frequency: Every 2-3 minutes  Fetal Heart Rate: Reactive    Assessment/Plan:  Reassuring fetal status, labor not progressing normally, will attempt to discontinue Pitocin and restart in 1 hour to see if adequate labor starts. If not will discuss surgical management with pt.    Mireille Dimas CNM

## 2018-03-21 NOTE — PROGRESS NOTES
Labor Progress Note  Patient seen, fetal heart rate and contraction pattern evaluated, patient examined. No data found. Physical Exam: ABle to stretch cervix to 5cm, 100% effaced, -3  Cervical Exam:  Membranes:  leaking clear fluid  Uterine Activity: Frequency: Every 2-3 minutes  Fetal Heart Rate: Reactive  IUPC inserted. Assessment/Plan:  Reassuring fetal status, Labor  Not progressing normally  continue pitocin augmentation, Continue plan for vaginal delivery, continue position changes.      Yun Paige CNM

## 2018-03-21 NOTE — ANESTHESIA PREPROCEDURE EVALUATION
Anesthetic History   No history of anesthetic complications            Review of Systems / Medical History  Patient summary reviewed, nursing notes reviewed and pertinent labs reviewed    Pulmonary            Asthma        Neuro/Psych   Within defined limits           Cardiovascular  Within defined limits                Exercise tolerance: >4 METS     GI/Hepatic/Renal     GERD           Endo/Other  Within defined limits           Other Findings            Physical Exam    Airway  Mallampati: III  TM Distance: 4 - 6 cm  Neck ROM: normal range of motion, short neck   Mouth opening: Normal     Cardiovascular               Dental         Pulmonary                 Abdominal         Other Findings            Anesthetic Plan    ASA: 3              Anesthetic plan and risks discussed with: Patient      Risks of epidural, including but not limited to bleeding, infection, back pain, headache, seizure, nerve injury, and block failure discussed with patient and accepted.

## 2018-03-22 LAB
HCT VFR BLD AUTO: 29.7 % (ref 35–45)
HGB BLD-MCNC: 9.8 G/DL (ref 12–16)

## 2018-03-22 PROCEDURE — 74011250637 HC RX REV CODE- 250/637: Performed by: ADVANCED PRACTICE MIDWIFE

## 2018-03-22 PROCEDURE — 77030027138 HC INCENT SPIROMETER -A

## 2018-03-22 PROCEDURE — 36415 COLL VENOUS BLD VENIPUNCTURE: CPT | Performed by: OBSTETRICS & GYNECOLOGY

## 2018-03-22 PROCEDURE — 65270000029 HC RM PRIVATE

## 2018-03-22 PROCEDURE — 74011250636 HC RX REV CODE- 250/636: Performed by: OBSTETRICS & GYNECOLOGY

## 2018-03-22 PROCEDURE — 74011250636 HC RX REV CODE- 250/636: Performed by: ADVANCED PRACTICE MIDWIFE

## 2018-03-22 PROCEDURE — 85014 HEMATOCRIT: CPT | Performed by: OBSTETRICS & GYNECOLOGY

## 2018-03-22 PROCEDURE — 85018 HEMOGLOBIN: CPT | Performed by: OBSTETRICS & GYNECOLOGY

## 2018-03-22 RX ORDER — SODIUM CHLORIDE 0.9 % (FLUSH) 0.9 %
5-10 SYRINGE (ML) INJECTION AS NEEDED
Status: DISCONTINUED | OUTPATIENT
Start: 2018-03-22 | End: 2018-03-24 | Stop reason: HOSPADM

## 2018-03-22 RX ORDER — ZOLPIDEM TARTRATE 5 MG/1
5 TABLET ORAL
Status: DISCONTINUED | OUTPATIENT
Start: 2018-03-22 | End: 2018-03-24 | Stop reason: HOSPADM

## 2018-03-22 RX ORDER — SODIUM CHLORIDE, SODIUM LACTATE, POTASSIUM CHLORIDE, CALCIUM CHLORIDE 600; 310; 30; 20 MG/100ML; MG/100ML; MG/100ML; MG/100ML
125 INJECTION, SOLUTION INTRAVENOUS CONTINUOUS
Status: DISPENSED | OUTPATIENT
Start: 2018-03-22 | End: 2018-03-23

## 2018-03-22 RX ORDER — FACIAL-BODY WIPES
10 EACH TOPICAL
Status: DISCONTINUED | OUTPATIENT
Start: 2018-03-22 | End: 2018-03-24 | Stop reason: HOSPADM

## 2018-03-22 RX ORDER — ACETAMINOPHEN 325 MG/1
650 TABLET ORAL
Status: DISCONTINUED | OUTPATIENT
Start: 2018-03-22 | End: 2018-03-24 | Stop reason: HOSPADM

## 2018-03-22 RX ORDER — IBUPROFEN 400 MG/1
800 TABLET ORAL
Status: DISCONTINUED | OUTPATIENT
Start: 2018-03-25 | End: 2018-03-23 | Stop reason: SDUPTHER

## 2018-03-22 RX ORDER — SODIUM CHLORIDE 0.9 % (FLUSH) 0.9 %
5-10 SYRINGE (ML) INJECTION EVERY 8 HOURS
Status: DISCONTINUED | OUTPATIENT
Start: 2018-03-22 | End: 2018-03-24 | Stop reason: HOSPADM

## 2018-03-22 RX ORDER — OXYTOCIN/RINGER'S LACTATE 20/1000 ML
125 PLASTIC BAG, INJECTION (ML) INTRAVENOUS CONTINUOUS
Status: DISCONTINUED | OUTPATIENT
Start: 2018-03-22 | End: 2018-03-24 | Stop reason: HOSPADM

## 2018-03-22 RX ORDER — OXYCODONE AND ACETAMINOPHEN 5; 325 MG/1; MG/1
1-2 TABLET ORAL
Status: DISCONTINUED | OUTPATIENT
Start: 2018-03-22 | End: 2018-03-24 | Stop reason: HOSPADM

## 2018-03-22 RX ORDER — SODIUM CHLORIDE, SODIUM LACTATE, POTASSIUM CHLORIDE, CALCIUM CHLORIDE 600; 310; 30; 20 MG/100ML; MG/100ML; MG/100ML; MG/100ML
125 INJECTION, SOLUTION INTRAVENOUS CONTINUOUS
Status: DISCONTINUED | OUTPATIENT
Start: 2018-03-22 | End: 2018-03-24 | Stop reason: HOSPADM

## 2018-03-22 RX ORDER — KETOROLAC TROMETHAMINE 30 MG/ML
30 INJECTION, SOLUTION INTRAMUSCULAR; INTRAVENOUS EVERY 6 HOURS
Status: DISPENSED | OUTPATIENT
Start: 2018-03-22 | End: 2018-03-24

## 2018-03-22 RX ORDER — PROMETHAZINE HYDROCHLORIDE 25 MG/ML
25 INJECTION, SOLUTION INTRAMUSCULAR; INTRAVENOUS
Status: DISCONTINUED | OUTPATIENT
Start: 2018-03-22 | End: 2018-03-24 | Stop reason: HOSPADM

## 2018-03-22 RX ORDER — SIMETHICONE 80 MG
80 TABLET,CHEWABLE ORAL
Status: DISCONTINUED | OUTPATIENT
Start: 2018-03-22 | End: 2018-03-24 | Stop reason: HOSPADM

## 2018-03-22 RX ADMIN — DOCUSATE SODIUM 100 MG: 100 CAPSULE, LIQUID FILLED ORAL at 21:47

## 2018-03-22 RX ADMIN — KETOROLAC TROMETHAMINE 30 MG: 30 INJECTION, SOLUTION INTRAMUSCULAR at 05:56

## 2018-03-22 RX ADMIN — CEFAZOLIN SODIUM 2 G: 2 SOLUTION INTRAVENOUS at 03:39

## 2018-03-22 RX ADMIN — KETOROLAC TROMETHAMINE 30 MG: 30 INJECTION, SOLUTION INTRAMUSCULAR at 12:26

## 2018-03-22 RX ADMIN — DOCUSATE SODIUM 100 MG: 100 CAPSULE, LIQUID FILLED ORAL at 10:05

## 2018-03-22 RX ADMIN — CEFAZOLIN SODIUM 2 G: 2 SOLUTION INTRAVENOUS at 15:37

## 2018-03-22 RX ADMIN — CEFAZOLIN SODIUM 2 G: 2 SOLUTION INTRAVENOUS at 21:51

## 2018-03-22 RX ADMIN — CEFAZOLIN SODIUM 2 G: 2 SOLUTION INTRAVENOUS at 10:10

## 2018-03-22 RX ADMIN — KETOROLAC TROMETHAMINE 30 MG: 30 INJECTION, SOLUTION INTRAMUSCULAR at 18:23

## 2018-03-22 RX ADMIN — SODIUM CHLORIDE, SODIUM LACTATE, POTASSIUM CHLORIDE, AND CALCIUM CHLORIDE 125 ML/HR: 600; 310; 30; 20 INJECTION, SOLUTION INTRAVENOUS at 13:10

## 2018-03-22 NOTE — PROGRESS NOTES
[address] ROUNDING NOTE    Vishal Bowens  MR#: [id number]  : 1990  ACCOUNT #: [de-identified]   DATE OF SERVICE: 2018    TIME:  Approximately 8:38 p.m. REASON FOR ROUNDING NOTE:  The patient has been greater than 6 hours at 6 cm with a 2-day induction with arrest of labor. Certified nurse midwife has given me a call and the patient is ready and prepared for a primary  section for arrest of labor. Patient has had otherwise an uncomplicated pregnancy, just being postdates and the arrest of labor. She is group B strep status negative. Because of her PENICILLIN REACTION WHICH CAUSED A HIVE REACTION, the patient is going to be on clindamycin prophylaxis for the surgery. Couple has been counseled both by the certified nurse midwife and myself and all are in concurrence with the primary  section. LOCATION:  She is currently in labor room 7, [facility], [phone].       MD Dane Mosquera / [place]  D: 2018 20:28     T: 2018 20:59  JOB #: [id number]

## 2018-03-22 NOTE — OP NOTES
[address] REPORT    Llewellyn Landau  MR#: [id number]  : 1990  ACCOUNT #: [de-identified]   DATE OF SERVICE: 2018    PREOPERATIVE DIAGNOSES:  Arrest of labor at 6 cm, active stage over 6 hours, 41+1 weeks' gestation, exogenous obesity. POSTOPERATIVE DIAGNOSES:  Arrest of labor at 6 cm, active stage over 6 hours, 41+1 weeks' gestation, exogenous obesity, possible in a dystocia. PROCEDURE:  Primary low transverse  section delivery of intrauterine pregnancy. ANESTHESIA:  Epidural.    COMPLICATIONS:  None. TRANSFUSIONS:  None. PROPHYLAXIS:  Ancef. PATHOLOGY:  None. SURGEON:  naomi manuel     ASSISTANT:  See circulator's note     ESTIMATED BLOOD LOSS:  800cc     SPECIMENS REMOVED:  placenta     IMPLANTS:  o     INDICATIONS:  The patient is a 48-year-old primigravida  female on a 2-day induction because of being postdates, now at 41+1 weeks' gestation, over 6 hours at 6 cm despite Pitocin augmentation induction of labor, balloon induction of labor, and ripening. The patient also carries a sizable weight. DESCRIPTION OF PROCEDURE:  The patient was repaired under adequate epidural anesthesia in left lateral tilt position supine with a Parson catheter in place. Heartbeats were in the midline just below the umbilicus at 415 beats a minute. We had actually taped the abdomen up to reduce the panniculus to assist us in the surgery. We prepped and draped the patient. After correct timeout and all concurred and agreed upon, a Pfannenstiel incision was made. The anterior abdominal wall was taken down sharply, coagulation of bleeders. Peritoneum was entered sharply without difficulty. Bladder flap was created transversely undermined inferiorly bluntly. Bladder blade placed. The vertex was overriding the symphysis pubis.   Next, we did a small incision transversely in the midline to the thinned out lower segment of uterus and extended it bluntly. Next, we delivered an Apgar 8 and 9, 8 pounds 13 ounce male infant. Bulb suctioned, to neonatology in attendance. Cord blood was taken because of blood type O positive. Cord blood pHs were not necessary. We also did cord blood for DNA. Placenta was delivered, 3-vessel intact, and was not necessary to submit. Uterine confines were wiped with a dry lap for the remainder of the amniotic tissues. The patient required not only Pitocin, but 2 of Methergine and eventually 250 mcg of Hemabate necessary to even get uterine tone. It was then established. We did have a history of exercise-induced mild asthma, but because of the uterine atony, we had to use Hemabate. There were no reactions. Once uterine tone was established, the low transverse incision was closed in the following fashion; #1 chromic in running locked stitch through and through myometrium, imbricating suture 2-0 chromic to place the bladder to the serosa of the uterus. Uterus, tubes, and ovaries were consistent with the gestation interval of the individual.  Correct needle, sponge, and instrument count. Initial blood loss about 800 mL. After copious amount of irrigation utilized to cleanse the pelvic and lower abdominal recess, the anterior abdominal wall was closed in the following fashion; 0 chromic for reperitonealization, #1 chromic for the patient's diastasis, #1 Vicryl running interlocked stitch for fascia closure, 2-0 Vicryl running stitch in 3 layers of subcutaneous closure and a subcuticular stitch of Monocryl. A pressure bandage was applied, 1000 mcg of Cytotec were placed rectally at the end of procedure to maintain uterine tone. The patient was then taken to the recovery room in stabilized condition. COMPLICATIONS:  None. FINDINGS:  See operative summary.       MD MARIXA Lowery / LAURA  D: 2018 21:57     T: 2018 22:40  JOB #: [id number]

## 2018-03-22 NOTE — ANESTHESIA POST-OP FOLLOW UP
Anesthesia: Post-op Duramorph Rounds      Pt reported good post-op analgesia from  Duramorph. No complaints of back pain or headache from neuraxial block  No complaints of residual motor or sensory deficits. No apparent anesthetic complications.

## 2018-03-22 NOTE — OP NOTES
Section Delivery Procedure Note    Name: Gilberto Brandt   Medical Record Number: [id number]   YOB: 1990  Today's Date: 2018      Preoperative Diagnosis: Primary   FTP    Postoperative Diagnosis: * No post-op diagnosis entered *    Procedure: Low Cervical Transverse Procedure(s):   SECTION    Surgeon(s):  Alexsander Quevedo MD    Anesthesia: Epidural    Prenatal Labs:   Lab Results   Component Value Date/Time    ABO/Rh(D) O POSITIVE 2018 09:00 AM    HBsAg, External negative 2017    HIV, External negative 2017    Rubella, External neg 2017    RPR, External non reactive 2017    Gonorrhea, External negative 2018    Chlamydia, External negative 2017    GrBStrep, External negative 2018    ABO,Rh O positive 2017        Prophylactic Antibiotics: Ancef pre-op Ancef pre-delivery 1 doses. Procedure Details:    See dictation.  Ticket number [id number]      Estimated Blood Loss: 800 ml   Replacement: 0    Fetal Description: bowles male    Apgar - One Minute: 8    Apgar - Five Minutes: 9    Umbilical Cord: 3 vessels present             Cord Blood Results:   Information for the patient's :  Mathew Conde [[id number]]   No results found for: PCTABR, PCTDIG, BILI, ABORH         Birth Information:   Information for the patient's :  Mathew Conde [[id number]]   One Minute Apgar: 8 (Filed from Delivery Summary)  Five  Minute Apgar: 9 (Filed from Delivery Summary)      Placenta:  manual removal    Specimens: * No specimens in log *       Maternal Findings    Uterus Size: enlarged, Fibroids: no , Adhesions: {None, Anomalies: None Defects: no   Tubes normal   Ovaries normal   Abdomen Adhesions: None   Pelvis Adhesions: None            Complications:  none     Drains:         Birth Weight: 8#13 oz        Mother's Condition: good  Baby's Condition: good    Signed: Alexsander Quevedo MD      2018

## 2018-03-22 NOTE — PROGRESS NOTES
2310-Bedside and verbal shift change report received from MICHAEL Muniz RN. Pt 1hr post-op . Pt reports pain 2/10 on pain scale to abd area. Pt is firm at U with small rubra. Parson catheter patent and draining. Dressing to lower abd clean, dry and intact. 20G IV to left hand patent and infusing. SCD's on BLE. Bed in lowest position, call light in reach. 0148-TRANSFER - OUT REPORT:    Verbal report given to JING Lr RN(name) on Kd Lovett  being transferred to mother/baby(unit) for routine post - op       Report consisted of patients Situation, Background, Assessment and   Recommendations(SBAR). Information from the following report(s) SBAR was reviewed with the receiving nurse. Lines:   Peripheral IV 18 Left Hand (Active)   Site Assessment Clean, dry, & intact 3/21/2018  7:25 AM   Phlebitis Assessment 0 3/21/2018  7:25 AM   Infiltration Assessment 0 3/21/2018  7:25 AM   Dressing Status Clean, dry, & intact 3/21/2018  7:25 AM   Dressing Type Transparent 3/21/2018  7:25 AM   Hub Color/Line Status Pink; Infusing;Patent 3/21/2018  7:25 AM   Alcohol Cap Used Yes 3/21/2018  7:25 AM        Opportunity for questions and clarification was provided.       Patient transported with:   Registered Nurse

## 2018-03-22 NOTE — PROGRESS NOTES
Post-Operative  Day 1    Melanie Devi     Assessment:   Hospital Problems  Date Reviewed: 3/21/2018    None        Post-Op day 1, stable    Plan:   1. Routine post-operative care   2. The risks and benefits of the circumcision  procedure and anesthesia including: bleeding, infection, variability of cosmetic results were discussed at length with the mother. She is aware that future repeat procedures may be necessary. She gives informed consent to proceed as noted and her questions are answered. Information for the patient's :  Verito Collier [[id number]]   , Low Transverse   Patient doing well without significant complaint. Nausea and vomiting resolved, tolerating liquids, passing flatus, perkins in place. Pain well controlled. Breastfeeding without concerns.      Current Facility-Administered Medications   Medication Dose Route Frequency    sodium chloride (NS) flush 5-10 mL  5-10 mL IntraVENous Q8H    oxytocin (PITOCIN) 20 units/1000 ml LR  125 mL/hr IntraVENous CONTINUOUS    lactated Ringers infusion  125 mL/hr IntraVENous CONTINUOUS    ketorolac (TORADOL) injection 30 mg  30 mg IntraVENous Q6H    lactated Ringers infusion  125 mL/hr IntraVENous CONTINUOUS    oxytocin (PITOCIN) 30 units/500 mL D5W  0.5-20 brandee-units/min IntraVENous TITRATE    docusate sodium (COLACE) capsule 100 mg  100 mg Oral BID    sodium citrate-citric acid (BICITRA) 500-334 mg/5 mL oral solution        ceFAZolin in dextrose (iso-os) 2 gram/50 mL IVPB pgbk        ceFAZolin (ANCEF) 2g IVPB in 50 mL D5W  2 g IntraVENous Q6H    lactated Ringers infusion  125 mL/hr IntraVENous CONTINUOUS    oxytocin (PITOCIN) 30 units/500 mL D5W  2 brandee-units/min IntraVENous TITRATE        Vitals:  Visit Vitals    /57 (BP 1 Location: Left arm, BP Patient Position: At rest)    Pulse (!) 106    Temp 98.8 °F (37.1 °C)    Resp 20    Ht 4' 11\" (1.499 m)    Wt 116.1 kg (256 lb)    SpO2 98%    Breastfeeding Yes    BMI 51.71 kg/m2     Temp (24hrs), Av.4 °F (36.9 °C), Min:97.8 °F (36.6 °C), Max:99 °F (37.2 °C)      Last 24hr Input/Output:    Intake/Output Summary (Last 24 hours) at 18 0842  Last data filed at 18 0156   Gross per 24 hour   Intake             2300 ml   Output             1550 ml   Net              750 ml          Exam:        Patient without distress. Lungs clear. Abdomen, bowel sounds present, soft, expected tenderness, fundus firm Wound dressing intact     Perineum normal lochia noted               Lower extremities are negative for erythema, and tenderness. Slight pedal edema bilaterally.  SCDs in place     Labs:   Lab Results   Component Value Date/Time    WBC 9.7 2018 09:05 AM    WBC 8.0 2010 11:43 AM    HGB 9.8 (L) 2018 05:35 AM    HGB 12.0 2018 09:05 AM    HGB 14.5 2010 11:43 AM    HCT 29.7 (L) 2018 05:35 AM    HCT 36.3 2018 09:05 AM    HCT 42.5 2010 11:43 AM    PLATELET 125  09:05 AM    PLATELET 429 1543 11:43 AM       Recent Results (from the past 24 hour(s))   HEMATOCRIT    Collection Time: 18  5:35 AM   Result Value Ref Range    HCT 29.7 (L) 35.0 - 45.0 %   HEMOGLOBIN    Collection Time: 18  5:35 AM   Result Value Ref Range    HGB 9.8 (L) 12.0 - 16.0 g/dL

## 2018-03-22 NOTE — PROGRESS NOTES
Received handoff report from JING Mckeon RN. Patient is in stable condition and resting in room. Call bell within reach. No further needs at this time. Will continue to monitor.

## 2018-03-22 NOTE — PROGRESS NOTES
EMR entered today for educational purposes in the role of [place] Instructor supervising the nursing student obtaining vital signs

## 2018-03-22 NOTE — ROUTINE PROCESS
0200-TRANSFER - IN REPORT:    Verbal report received from LOIDA Aldridge RN(name) on Yessenia Davis  being received from L&D(unit) for routine progression of care      Report consisted of patients Situation, Background, Assessment and   Recommendations(SBAR). Information from the following report(s) SBAR, Kardex, Intake/Output and MAR was reviewed with the receiving nurse. Opportunity for questions and clarification was provided. Assessment completed upon patients arrival to unit and care assumed.

## 2018-03-22 NOTE — PROGRESS NOTES
Bedside and Verbal shift change report given to JING Lr RN (oncoming nurse) by Rory Wiggins. Lukas Burgess RN (offgoing nurse). Report included the following information SBAR, Kardex, Intake/Output, MAR and Recent Results.

## 2018-03-22 NOTE — ROUTINE PROCESS
Bedside shift change report given to Fili Solomon RN (oncoming nurse) by Kirsten Arizmendi RN   (offgoing nurse). Report included the following information SBAR, Kardex, Intake/Output, MAR, Recent Results and Med Rec Status.

## 2018-03-22 NOTE — ANESTHESIA POSTPROCEDURE EVALUATION
Post-Anesthesia Evaluation and Assessment    Cardiovascular Function/Vital Signs  Visit Vitals    BP (!) 138/93    Pulse (!) 104    Temp 36.7 °C (98 °F)    Resp 17    Ht 4' 11\" (1.499 m)    Wt 116.1 kg (256 lb)    SpO2 99%    Breastfeeding Yes    BMI 51.71 kg/m2       Patient is status post Procedure(s):   SECTION. Nausea/Vomiting: Controlled. Postoperative hydration reviewed and adequate. Pain:  Pain Scale 1: Numeric (0 - 10) (18)  Pain Intensity 1: 0 (18)   Managed. Neurological Status: At baseline. Mental Status and Level of Consciousness: Arousable. Pulmonary Status:   O2 Device: Room air (18)   Adequate oxygenation and airway patent. Complications related to anesthesia: None    Post-anesthesia assessment completed. No concerns. Patient has met all discharge requirements.     Signed By: Mohinder Gutierrez CRNA    2018

## 2018-03-22 NOTE — ROUTINE PROCESS
Bedside and Verbal shift change report given to LUIS ENRIQUE Davila RN (oncoming nurse) by Manda Kawasaki RN (offgoing nurse). Report included the following information SBAR, Kardex and MAR.

## 2018-03-23 PROCEDURE — 74011250637 HC RX REV CODE- 250/637: Performed by: ADVANCED PRACTICE MIDWIFE

## 2018-03-23 PROCEDURE — 65270000029 HC RM PRIVATE

## 2018-03-23 PROCEDURE — 74011250636 HC RX REV CODE- 250/636: Performed by: ADVANCED PRACTICE MIDWIFE

## 2018-03-23 RX ORDER — OXYCODONE AND ACETAMINOPHEN 5; 325 MG/1; MG/1
1-2 TABLET ORAL
Qty: 40 TAB | Refills: 0 | Status: SHIPPED | OUTPATIENT
Start: 2018-03-23

## 2018-03-23 RX ORDER — DOCUSATE SODIUM 100 MG/1
100 CAPSULE, LIQUID FILLED ORAL
Qty: 60 CAP | Refills: 0 | Status: SHIPPED | OUTPATIENT
Start: 2018-03-23

## 2018-03-23 RX ORDER — IBUPROFEN 800 MG/1
800 TABLET ORAL
Qty: 60 TAB | Refills: 0 | Status: SHIPPED | OUTPATIENT
Start: 2018-03-25

## 2018-03-23 RX ORDER — IBUPROFEN 400 MG/1
800 TABLET ORAL
Status: DISCONTINUED | OUTPATIENT
Start: 2018-03-23 | End: 2018-03-24 | Stop reason: HOSPADM

## 2018-03-23 RX ORDER — LANOLIN ALCOHOL/MO/W.PET/CERES
325 CREAM (GRAM) TOPICAL 2 TIMES DAILY WITH MEALS
Qty: 60 TAB | Refills: 2 | Status: SHIPPED | OUTPATIENT
Start: 2018-03-23

## 2018-03-23 RX ORDER — LANOLIN ALCOHOL/MO/W.PET/CERES
1 CREAM (GRAM) TOPICAL 2 TIMES DAILY WITH MEALS
Status: DISCONTINUED | OUTPATIENT
Start: 2018-03-23 | End: 2018-03-24 | Stop reason: HOSPADM

## 2018-03-23 RX ADMIN — DOCUSATE SODIUM 100 MG: 100 CAPSULE, LIQUID FILLED ORAL at 21:12

## 2018-03-23 RX ADMIN — IBUPROFEN 800 MG: 400 TABLET ORAL at 12:47

## 2018-03-23 RX ADMIN — FERROUS SULFATE TAB 325 MG (65 MG ELEMENTAL FE) 325 MG: 325 (65 FE) TAB at 17:10

## 2018-03-23 RX ADMIN — IBUPROFEN 800 MG: 400 TABLET ORAL at 21:12

## 2018-03-23 RX ADMIN — DOCUSATE SODIUM 100 MG: 100 CAPSULE, LIQUID FILLED ORAL at 09:10

## 2018-03-23 RX ADMIN — KETOROLAC TROMETHAMINE 30 MG: 30 INJECTION, SOLUTION INTRAMUSCULAR at 06:13

## 2018-03-23 RX ADMIN — KETOROLAC TROMETHAMINE 30 MG: 30 INJECTION, SOLUTION INTRAMUSCULAR at 00:12

## 2018-03-23 RX ADMIN — FERROUS SULFATE TAB 325 MG (65 MG ELEMENTAL FE) 325 MG: 325 (65 FE) TAB at 09:10

## 2018-03-23 NOTE — PROGRESS NOTES
Post-Operative  Day 2    Melanie Devi     Assessment:   Hospital Problems  Date Reviewed: 3/21/2018    None        Primary Low Transverse  Section, Post-Op day 2    Plan: 1. Continue routine post-operative, postpartum care and education. 2.Encourage advance in nutrition, diet and ambulation as tolerated. 3.Support maternal and infant bonding. 4.Hgb= 9.8, Ferrous sulfate 325 mg po bid prescribed. 5.Anticipate DC home tomorrow. Information for the patient's :  Gina Delgado [[id number]]   , Low Transverse   Patient doing well without significant complaint. VSS. Afebrile. She denies dizziness, no nausea, no vomiting. She is tolerating liquids, passing flatus, voiding and ambulating without difficulty. She is bonding well with infant and breastfeeding. She states good pain control with current prescribed pain medication. Vitals:  Visit Vitals    /53 (BP 1 Location: Right arm, BP Patient Position: At rest)    Pulse 89    Temp 98.1 °F (36.7 °C)    Resp 18    Ht 4' 11\" (1.499 m)    Wt 116.1 kg (256 lb)    SpO2 96%    Breastfeeding Yes    BMI 51.71 kg/m2     Temp (24hrs), Av.3 °F (36.8 °C), Min:98 °F (36.7 °C), Max:98.8 °F (37.1 °C)        Exam:      Patient without distress. Lungs clear bilaterally to all bases. Abdomen soft, bowel sounds present,    Fundus firm at umbilicus,    Wound dressing removed, incision clean, dry and intact. Lower extremities with trace swelling, no cords or tenderness. Reflexes +2 brisk, no clonus.     Labs:   Lab Results   Component Value Date/Time    WBC 9.7 2018 09:05 AM    WBC 8.0 2010 11:43 AM    HGB 9.8 (L) 2018 05:35 AM    HGB 12.0 2018 09:05 AM    HGB 14.5 2010 11:43 AM    HCT 29.7 (L) 2018 05:35 AM    HCT 36.3 2018 09:05 AM    HCT 42.5 2010 11:43 AM    PLATELET 297 56/10/1912 09:05 AM    PLATELET 620  11:43 AM       No results found for this or any previous visit (from the past 24 hour(s)).

## 2018-03-23 NOTE — PROGRESS NOTES
26- Pt stated she felt sharp pain to incision and burning when she sat up in bed. Small open area to end of incision noted and no drainage. Steri-strip applied to open area. Pt braeden well.

## 2018-03-23 NOTE — ROUTINE PROCESS
Bedside and Verbal shift change report given to PADDY Eldridge RN (oncoming nurse) by Delmis RN (offgoing nurse). Report included the following information SBAR, Kardex, Intake/Output and MAR.

## 2018-03-23 NOTE — DISCHARGE SUMMARY
Obstetrical Discharge Summary     Name: Anitha Cervantes MRN: [id number]  SSN: [id number]    YOB: 1990  Age: 28 y.o. Sex: female      Admit Date: 3/20/2018    Discharge Date: 3/23/2018    Admitting Physician: Bridger Camacho MD     Attending Physician:  Bridger Camacho MD     Discharge Diagnoses:   Information for the patient's :  Birgit Jacob [[id number]]   Delivery of a 3.998 kg male infant via , Low Transverse on 3/21/2018 at 9:03 PM  by . Apgars were 8 and 9. Additional Diagnoses:   Problem List as of 3/23/2018  Date Reviewed: 3/23/2018          Codes Class Noted - Resolved    Postpartum care following  delivery ICD-10-CM: Z39.2  ICD-9-CM: V24.2  3/23/2018 - Present        RESOLVED: Arrested active phase of labor ICD-10-CM: O62.1  ICD-9-CM: 661.10  3/21/2018 - 3/21/2018        RESOLVED: IUP (intrauterine pregnancy), incidental ICD-10-CM: Z34.90  ICD-9-CM: V22.2  3/20/2018 - 3/21/2018              Lab Results   Component Value Date/Time    Rubella, External neg 2017    GrBStrep, External negative 2018     Recent Labs      18   0535   HGB  9.8*       Hospital Course: Normal hospital course following the delivery. Patient Instructions:   Current Discharge Medication List      START taking these medications    Details   docusate sodium (COLACE) 100 mg capsule Take 1 Cap by mouth two (2) times daily as needed for Constipation. Qty: 60 Cap, Refills: 0      ferrous sulfate 325 mg (65 mg iron) tablet Take 1 Tab by mouth two (2) times daily (with meals). Indications: Iron Deficiency Anemia  Qty: 60 Tab, Refills: 2      ibuprofen (MOTRIN) 800 mg tablet Take 1 Tab by mouth every eight (8) hours as needed. Indications: Pain  Qty: 60 Tab, Refills: 0    Associated Diagnoses: Postpartum care following  delivery      oxyCODONE-acetaminophen (PERCOCET) 5-325 mg per tablet Take 1-2 Tabs by mouth every six (6) hours as needed.  Max Daily Amount: 8 Tabs. Qty: 40 Tab, Refills: 0    Associated Diagnoses: Postpartum care following  delivery         CONTINUE these medications which have NOT CHANGED    Details   PNV#16-Iron Fum & PS-FA-OM-3 35-1-200 mg cap Take  by mouth. Reference my discharge instructions. Follow-up Appointments   Procedures    FOLLOW UP VISIT Appointment in: 6 Weeks Schedule follow-up postpartum appointment in 6 weeks with OB/GYN office. Schedule follow-up postpartum appointment in 6 weeks with OB/GYN office.      Standing Status:   Standing     Number of Occurrences:   1     Order Specific Question:   Appointment in     Answer:   6 Weeks        Signed By:  Socorro Sanchez CNM     2018                       BST

## 2018-03-24 VITALS
SYSTOLIC BLOOD PRESSURE: 129 MMHG | OXYGEN SATURATION: 97 % | DIASTOLIC BLOOD PRESSURE: 68 MMHG | HEIGHT: 59 IN | BODY MASS INDEX: 51.61 KG/M2 | TEMPERATURE: 98 F | WEIGHT: 256 LBS | HEART RATE: 99 BPM | RESPIRATION RATE: 18 BRPM

## 2018-03-24 PROCEDURE — 74011250637 HC RX REV CODE- 250/637: Performed by: ADVANCED PRACTICE MIDWIFE

## 2018-03-24 RX ADMIN — IBUPROFEN 800 MG: 400 TABLET ORAL at 06:35

## 2018-03-24 NOTE — DISCHARGE INSTRUCTIONS
Patient given copies of Post Birth Warning signs and Post Birth discharge instructions. Help after discharge pamphlet given as well. MyChart Activation    Thank you for requesting access to [facility]. Please follow the instructions below to securely access and download your online medical record. [facility] allows you to send messages to your doctor, view your test results, renew your prescriptions, schedule appointments, and more. How Do I Sign Up? 1. In your internet browser, go to [email or web address]. 2. Click on the First Time User? Click Here link in the Sign In box. You will see the New Member Sign Up page. 3. Enter your [facility] Access Code exactly as it appears below. You will not need to use this code after youve completed the sign-up process. If you do not sign up before the expiration date, you must request a new code. [facility] Access Code: [email or web address]  Expires: 2018 10:31 AM (This is the date your [facility] access code will )    4. Enter the last four digits of your Social Security Number (xxxx) and Date of Birth (mm/dd/yyyy) as indicated and click Submit. You will be taken to the next sign-up page. 5. Create a [facility] ID. This will be your [facility] login ID and cannot be changed, so think of one that is secure and easy to remember. 6. Create a [facility] password. You can change your password at any time. 7. Enter your Password Reset Question and Answer. This can be used at a later time if you forget your password. 8. Enter your e-mail address. You will receive e-mail notification when new information is available in [address] 9. Click Sign Up. You can now view and download portions of your medical record. 10. Click the Download Summary menu link to download a portable copy of your medical information.     Additional Information    If you have questions, please visit the Frequently Asked Questions section of the [facility] website at [email or web address] Remember, MyChart is NOT to be used for urgent needs. For medical emergencies, dial 911. Patient armband removed and given to patient to take home.   Patient was informed of the privacy risks if armband lost or stolen

## 2018-03-24 NOTE — PROGRESS NOTES
Discharge instructions reviewed, copies given. Questions answered. E-sign done and prescriptions call in to pharmacy, not given. Patient will be discharged home with baby.

## 2018-03-24 NOTE — LACTATION NOTE
Per mom, infant latching and nursing well. Discharge teaching completed and discussed pumping and supplementing after until at least follow up pediatrician visit.

## 2018-03-24 NOTE — PROGRESS NOTES
Bedside shift change report given to MICHAEL Khalil RN (oncoming nurse) by Bharti Bello. Papo Saravia RN (offgoing nurse).  Report included the following information SBAR, Procedure Summary, Intake/Output, MAR and Recent Results.

## (undated) DEVICE — SUTURE CHROMIC GUT SZ 2-0 L36IN ABSRB BRN L36MM CT-1 1/2 923H

## (undated) DEVICE — PACK PROCEDURE SURG BIRTH

## (undated) DEVICE — KENDALL SCD EXPRESS SLEEVES, KNEE LENGTH, MEDIUM: Brand: KENDALL SCD

## (undated) DEVICE — SUT CHRMC 1 36IN CT1 BRN --

## (undated) DEVICE — MUCUS TRAP WITH VACUUM BREAKER AND FILTER,10 FR/CH (3.33 MM), CATHETER: Brand: ARGYLE

## (undated) DEVICE — REM POLYHESIVE ADULT PATIENT RETURN ELECTRODE: Brand: VALLEYLAB

## (undated) DEVICE — SUTURE MCRYL SZ 3-0 L27IN ABSRB UD L24MM PS-1 3/8 CIR PRIM Y936H

## (undated) DEVICE — SUT CHRMC 3-0 36IN V34 BRN --

## (undated) DEVICE — INTENDED FOR TISSUE SEPARATION, AND OTHER PROCEDURES THAT REQUIRE A SHARP SURGICAL BLADE TO PUNCTURE OR CUT.: Brand: BARD-PARKER ® CARBON RIB-BACK BLADES

## (undated) DEVICE — SUT VCRL + 2-0 36IN CT1 UD --

## (undated) DEVICE — 3L THIN WALL CAN: Brand: CRD

## (undated) DEVICE — DEVON™ KNEE AND BODY STRAP 60" X 3" (1.5 M X 7.6 CM): Brand: DEVON

## (undated) DEVICE — SUT VCRL + 1 36IN CT1 VIO --